# Patient Record
Sex: MALE | Race: WHITE | NOT HISPANIC OR LATINO | URBAN - METROPOLITAN AREA
[De-identification: names, ages, dates, MRNs, and addresses within clinical notes are randomized per-mention and may not be internally consistent; named-entity substitution may affect disease eponyms.]

---

## 2021-05-11 PROBLEM — Z00.00 ENCOUNTER FOR PREVENTIVE HEALTH EXAMINATION: Status: ACTIVE | Noted: 2021-05-11

## 2021-06-28 ENCOUNTER — OUTPATIENT (OUTPATIENT)
Dept: OUTPATIENT SERVICES | Facility: HOSPITAL | Age: 31
LOS: 1 days | Discharge: HOME | End: 2021-06-28

## 2021-06-28 DIAGNOSIS — E75.22 GAUCHER DISEASE: ICD-10-CM

## 2021-06-29 ENCOUNTER — RESULT REVIEW (OUTPATIENT)
Age: 31
End: 2021-06-29

## 2021-06-29 ENCOUNTER — APPOINTMENT (OUTPATIENT)
Dept: HEMATOLOGY ONCOLOGY | Facility: CLINIC | Age: 31
End: 2021-06-29
Payer: COMMERCIAL

## 2021-06-29 VITALS
TEMPERATURE: 98.4 F | WEIGHT: 150 LBS | OXYGEN SATURATION: 95 % | HEART RATE: 94 BPM | HEIGHT: 68 IN | BODY MASS INDEX: 22.73 KG/M2 | DIASTOLIC BLOOD PRESSURE: 82 MMHG | SYSTOLIC BLOOD PRESSURE: 126 MMHG | RESPIRATION RATE: 16 BRPM

## 2021-06-29 DIAGNOSIS — G43.909 MIGRAINE, UNSPECIFIED, NOT INTRACTABLE, W/OUT STATUS MIGRAINOSUS: ICD-10-CM

## 2021-06-29 DIAGNOSIS — Z78.9 OTHER SPECIFIED HEALTH STATUS: ICD-10-CM

## 2021-06-29 LAB
BASOPHILS # BLD AUTO: 0.03 K/UL — SIGNIFICANT CHANGE UP (ref 0–0.2)
BASOPHILS NFR BLD AUTO: 0.4 % — SIGNIFICANT CHANGE UP (ref 0–2)
EOSINOPHIL # BLD AUTO: 0.05 K/UL — SIGNIFICANT CHANGE UP (ref 0–0.5)
EOSINOPHIL NFR BLD AUTO: 0.6 % — SIGNIFICANT CHANGE UP (ref 0–6)
HCT VFR BLD CALC: 40.2 % — SIGNIFICANT CHANGE UP (ref 39–50)
HGB BLD-MCNC: 14.3 G/DL — SIGNIFICANT CHANGE UP (ref 13–17)
IMM GRANULOCYTES NFR BLD AUTO: 0.5 % — SIGNIFICANT CHANGE UP (ref 0–1.5)
LYMPHOCYTES # BLD AUTO: 1.79 K/UL — SIGNIFICANT CHANGE UP (ref 1–3.3)
LYMPHOCYTES # BLD AUTO: 23.1 % — SIGNIFICANT CHANGE UP (ref 13–44)
MCHC RBC-ENTMCNC: 32.3 PG — SIGNIFICANT CHANGE UP (ref 27–34)
MCHC RBC-ENTMCNC: 35.6 G/DL — SIGNIFICANT CHANGE UP (ref 32–36)
MCV RBC AUTO: 90.7 FL — SIGNIFICANT CHANGE UP (ref 80–100)
MONOCYTES # BLD AUTO: 0.48 K/UL — SIGNIFICANT CHANGE UP (ref 0–0.9)
MONOCYTES NFR BLD AUTO: 6.2 % — SIGNIFICANT CHANGE UP (ref 2–14)
NEUTROPHILS # BLD AUTO: 5.37 K/UL — SIGNIFICANT CHANGE UP (ref 1.8–7.4)
NEUTROPHILS NFR BLD AUTO: 69.2 % — SIGNIFICANT CHANGE UP (ref 43–77)
NRBC # BLD: 0 /100 WBCS — SIGNIFICANT CHANGE UP (ref 0–0)
PLATELET # BLD AUTO: 93 K/UL — LOW (ref 150–400)
RBC # BLD: 4.43 M/UL — SIGNIFICANT CHANGE UP (ref 4.2–5.8)
RBC # FLD: 12.7 % — SIGNIFICANT CHANGE UP (ref 10.3–14.5)
WBC # BLD: 7.76 K/UL — SIGNIFICANT CHANGE UP (ref 3.8–10.5)
WBC # FLD AUTO: 7.76 K/UL — SIGNIFICANT CHANGE UP (ref 3.8–10.5)

## 2021-06-29 PROCEDURE — 99205 OFFICE O/P NEW HI 60 MIN: CPT

## 2021-06-30 LAB
APTT BLD: 32.7 SEC
INR PPP: 1.16 RATIO
PT BLD: 13.6 SEC

## 2021-07-01 PROBLEM — G43.909 MIGRAINE HEADACHE: Status: ACTIVE | Noted: 2021-07-01

## 2021-07-01 PROBLEM — Z78.9 RARELY CONSUMES ALCOHOL: Status: ACTIVE | Noted: 2021-07-01

## 2021-07-01 NOTE — RESULTS/DATA
[FreeTextEntry1] : 6/29/21\par WBC 7760 Hgb 14.3 Hct 40.2 Platelets 93,000 Diff normal \par Smear: Decreased platelets with giant forms. NC/NC. Teardrops. WBC normal.

## 2021-07-01 NOTE — REVIEW OF SYSTEMS
[Negative] : Allergic/Immunologic [Fatigue] : fatigue [Patient Intake Form Reviewed] : Patient intake form was reviewed [de-identified] : migraine headaches

## 2021-07-01 NOTE — REASON FOR VISIT
[Initial Consultation] : an initial consultation for [Blood Count Assessment] : blood count assessment [FreeTextEntry2] : Gaucher's Disease

## 2021-07-01 NOTE — CONSULT LETTER
[Dear  ___] : Dear  [unfilled], [Consult Letter:] : I had the pleasure of evaluating your patient, [unfilled]. [Please see my note below.] : Please see my note below. [Consult Closing:] : Thank you very much for allowing me to participate in the care of this patient.  If you have any questions, please do not hesitate to contact me. [Sincerely,] : Sincerely, [DrKaterina  ___] : Dr. PAK [FreeTextEntry2] : Jonah Duque MD [FreeTextEntry3] : Roni Calabrese M.D., FACP\par Professor of Medicine\par [facility] [employer]\par Associate Chief, Division of Hematology\par [facility]\par [facility]\par [address] [place] [address]\[place] [address]\par [phone]

## 2021-07-01 NOTE — ADDENDUM
[FreeTextEntry1] : I, Elisabeth Agatha, acted solely as a scribe for Dr. Roni Calabrese on 06/29/2021. All medical entries made by the Scribe were at my, Dr. Roni Calabrese's, direction and personally dictated by me on 06/29/2021. I have reviewed the chart and agree that the record accurately reflects my personal performance of the history, physical exam, assessment and plan. I have also personally directed, reviewed, and agreed with the chart.

## 2021-07-01 NOTE — PHYSICAL EXAM
[Fully active, able to carry on all pre-disease performance without restriction] : Status 0 - Fully active, able to carry on all pre-disease performance without restriction [Normal] : pharynx is unremarkable, moist mucus membrane, no oral lesions [Normal Male] : prostate smooth, symmetric with no modularity or induration

## 2021-07-01 NOTE — ASSESSMENT
[Palliative Care Plan] : not applicable at this time [FreeTextEntry1] : 31 year old male with Gaucher cells found in the bone marrow of his mandible. He is asymptomatic and has no palpable hepatosplenomegaly. Complete blood count does reveal mild thrombocytopenia. I reviewed Gaucher's disease with him. We discussed that it is one of the [employer]azi J[employer]ism genetic diseases. I reviewed the hereditary pattern, its symptomatology, and potential therapies. I explained that further evaluation is indicated. \par \par Plan:\par Glucocerebrosidase level\par Gaucher mutation panel\par PT/APTT\par Avoid ASA/NSAIDS\par Call me in 2 weeks\par \par

## 2021-07-01 NOTE — HISTORY OF PRESENT ILLNESS
[Disease:__________________________] : Disease: [unfilled] [de-identified] : 31 year old male referred in consultation for possible Gaucher's disease. He had been seen in Dental consultation regarding gum disease. In April 2021, biopsy of his mandible revealed a histiocytic proliferation involving bone marrow consistent with Gaucher's disease. He feels well. He is always tired. He notes no headaches, visual problems, chest pain, SOB, abdominal pain, weight loss, bleeding, bruising, skeletal pain. There is no family history of Gaucher's disease.\par \par

## 2021-07-08 LAB
MISCELLANEOUS TEST: NORMAL
PROC NAME: NORMAL

## 2021-07-15 LAB — GAU1 GENE MUT TESTED BLD/T: POSITIVE

## 2022-01-04 ENCOUNTER — APPOINTMENT (OUTPATIENT)
Dept: MRI IMAGING | Facility: CLINIC | Age: 32
End: 2022-01-04

## 2022-01-12 ENCOUNTER — APPOINTMENT (OUTPATIENT)
Dept: MRI IMAGING | Facility: CLINIC | Age: 32
End: 2022-01-12
Payer: COMMERCIAL

## 2022-01-12 ENCOUNTER — TRANSCRIPTION ENCOUNTER (OUTPATIENT)
Age: 32
End: 2022-01-12

## 2022-01-12 ENCOUNTER — OUTPATIENT (OUTPATIENT)
Dept: OUTPATIENT SERVICES | Facility: HOSPITAL | Age: 32
LOS: 1 days | End: 2022-01-12

## 2022-01-12 ENCOUNTER — APPOINTMENT (OUTPATIENT)
Dept: RADIOLOGY | Facility: CLINIC | Age: 32
End: 2022-01-12
Payer: COMMERCIAL

## 2022-01-12 PROCEDURE — 72100 X-RAY EXAM L-S SPINE 2/3 VWS: CPT | Mod: 26

## 2022-01-12 PROCEDURE — 73718 MRI LOWER EXTREMITY W/O DYE: CPT | Mod: 26,RT

## 2022-01-12 PROCEDURE — 73552 X-RAY EXAM OF FEMUR 2/>: CPT | Mod: 26

## 2022-01-12 PROCEDURE — 73718 MRI LOWER EXTREMITY W/O DYE: CPT | Mod: 26,LT

## 2022-01-12 PROCEDURE — 72040 X-RAY EXAM NECK SPINE 2-3 VW: CPT | Mod: 26

## 2022-01-12 PROCEDURE — 74181 MRI ABDOMEN W/O CONTRAST: CPT | Mod: 26

## 2022-01-13 PROCEDURE — 77080 DXA BONE DENSITY AXIAL: CPT | Mod: 26

## 2022-02-11 ENCOUNTER — OUTPATIENT (OUTPATIENT)
Dept: OUTPATIENT SERVICES | Facility: HOSPITAL | Age: 32
LOS: 1 days | Discharge: HOME | End: 2022-02-11

## 2022-02-11 DIAGNOSIS — E75.22 GAUCHER DISEASE: ICD-10-CM

## 2022-02-15 ENCOUNTER — LABORATORY RESULT (OUTPATIENT)
Age: 32
End: 2022-02-15

## 2022-02-15 ENCOUNTER — APPOINTMENT (OUTPATIENT)
Dept: HEMATOLOGY ONCOLOGY | Facility: CLINIC | Age: 32
End: 2022-02-15
Payer: COMMERCIAL

## 2022-02-15 ENCOUNTER — RESULT REVIEW (OUTPATIENT)
Age: 32
End: 2022-02-15

## 2022-02-15 VITALS
BODY MASS INDEX: 23.17 KG/M2 | DIASTOLIC BLOOD PRESSURE: 74 MMHG | TEMPERATURE: 97.1 F | OXYGEN SATURATION: 97 % | WEIGHT: 152.88 LBS | RESPIRATION RATE: 14 BRPM | HEIGHT: 68.11 IN | SYSTOLIC BLOOD PRESSURE: 110 MMHG | HEART RATE: 62 BPM

## 2022-02-15 DIAGNOSIS — D69.6 THROMBOCYTOPENIA, UNSPECIFIED: ICD-10-CM

## 2022-02-15 DIAGNOSIS — E75.22 GAUCHER DISEASE: ICD-10-CM

## 2022-02-15 LAB
BASOPHILS # BLD AUTO: 0.04 K/UL — SIGNIFICANT CHANGE UP (ref 0–0.2)
BASOPHILS NFR BLD AUTO: 0.5 % — SIGNIFICANT CHANGE UP (ref 0–2)
EOSINOPHIL # BLD AUTO: 0.1 K/UL — SIGNIFICANT CHANGE UP (ref 0–0.5)
EOSINOPHIL NFR BLD AUTO: 1.3 % — SIGNIFICANT CHANGE UP (ref 0–6)
HCT VFR BLD CALC: 38.7 % — LOW (ref 39–50)
HGB BLD-MCNC: 14 G/DL — SIGNIFICANT CHANGE UP (ref 13–17)
IMM GRANULOCYTES NFR BLD AUTO: 1.3 % — SIGNIFICANT CHANGE UP (ref 0–1.5)
LYMPHOCYTES # BLD AUTO: 2.02 K/UL — SIGNIFICANT CHANGE UP (ref 1–3.3)
LYMPHOCYTES # BLD AUTO: 26.2 % — SIGNIFICANT CHANGE UP (ref 13–44)
MCHC RBC-ENTMCNC: 32.6 PG — SIGNIFICANT CHANGE UP (ref 27–34)
MCHC RBC-ENTMCNC: 36.2 G/DL — HIGH (ref 32–36)
MCV RBC AUTO: 90 FL — SIGNIFICANT CHANGE UP (ref 80–100)
MONOCYTES # BLD AUTO: 0.53 K/UL — SIGNIFICANT CHANGE UP (ref 0–0.9)
MONOCYTES NFR BLD AUTO: 6.9 % — SIGNIFICANT CHANGE UP (ref 2–14)
NEUTROPHILS # BLD AUTO: 4.92 K/UL — SIGNIFICANT CHANGE UP (ref 1.8–7.4)
NEUTROPHILS NFR BLD AUTO: 63.8 % — SIGNIFICANT CHANGE UP (ref 43–77)
NRBC # BLD: 0 /100 WBCS — SIGNIFICANT CHANGE UP (ref 0–0)
PLATELET # BLD AUTO: 82 K/UL — LOW (ref 150–400)
RBC # BLD: 4.3 M/UL — SIGNIFICANT CHANGE UP (ref 4.2–5.8)
RBC # FLD: 12.9 % — SIGNIFICANT CHANGE UP (ref 10.3–14.5)
WBC # BLD: 7.71 K/UL — SIGNIFICANT CHANGE UP (ref 3.8–10.5)
WBC # FLD AUTO: 7.71 K/UL — SIGNIFICANT CHANGE UP (ref 3.8–10.5)

## 2022-02-15 PROCEDURE — 99215 OFFICE O/P EST HI 40 MIN: CPT

## 2022-02-15 NOTE — CONSULT LETTER
[Dear  ___] : Dear  [unfilled], [Courtesy Letter:] : I had the pleasure of seeing your patient, [unfilled], in my office today. [Please see my note below.] : Please see my note below. [Sincerely,] : Sincerely, [DrKaterina  ___] : Dr. PAK [FreeTextEntry2] : Jonah Duque MD [FreeTextEntry3] : Roni Calabrese M.D., FACP\par Professor of Medicine\par [facility] [employer]\par Associate Chief, Division of Hematology\par [facility]\par [facility]\par [address] [place] [address]\[place] [address]\par [phone]

## 2022-02-15 NOTE — ASSESSMENT
[Palliative Care Plan] : not applicable at this time [FreeTextEntry1] : 32 year old male with Gaucher cells found in the bone marrow of his mandible. 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 He is asymptomatic and has no palpable hepatosplenomegaly on physical examination today. 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 However, he does not report any skeletal pain. Platelets are now dropping. I reviewed the hereditary pattern, its symptomatology, and potential therapies. Explained that all their children are at least carriers. Given his worsening thrombocytopenia and skeletal changes, we discussed treatment with oral therapies or enzyme replacement therapies such as Cerezyme in detail with the patient and his wife. Discussed risks, benefits, and side effects. They wish to take some time to consider if they will pursue therapy. They live in [place] and will consider pursuing treatment in [place]. Answered all questions.\par \par Plan:\par Avoid ASA/NSAIDS\par Consider Cerezyme \par Bone densitometry\par CMP, LDH, TFTs, SPEP, ACE, chitotriosidase level, Fe/TIBC, ferritin, total acid phosphatase \par COVID booster vaccine \par Flu vaccine \par Screen first degree relatives and spouse for Gaucher's disease \par RTC 2 months\par \par \par \par \par \par \par

## 2022-02-15 NOTE — ADDENDUM
[FreeTextEntry1] : I, Elisabeth Agatha, acted solely as a scribe for Dr. Roni Calabrese on 02/15/2022. All medical entries made by the Scribe were at my, Dr. Roni Calabrese's, direction and personally dictated by me on 02/15/2022. I have reviewed the chart and agree that the record accurately reflects my personal performance of the history, physical exam, assessment and plan. I have also personally directed, reviewed, and agreed with the chart.

## 2022-02-15 NOTE — RESULTS/DATA
[FreeTextEntry1] : WBC 7710 Hgb 14 Hct 38.7 Platelets 82,000 Diff normal \par \par 1/12/22\par MRI abdomen: Massive splenomegaly. Hepatomegaly. Diffusely hypointense bone marrow signal with H-shaped vertebra at L2 and L3, consistent with Gaucher disease. 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 Mild levocurvature of the thoracolumbar junction.\par \par 6/29/21\par PT 13.6, INR 1.16\par APTT 32.7\par Glucocerebrosidase level 1.11 nmol/h/mg Prot\par Gauchers DNA positive for two copies of N370S\par \par

## 2022-02-15 NOTE — REASON FOR VISIT
[Follow-Up Visit] : a follow-up visit for [Blood Count Assessment] : blood count assessment [Spouse] : spouse [FreeTextEntry2] : Gaucher's Disease

## 2022-02-15 NOTE — HISTORY OF PRESENT ILLNESS
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 He is fatigued at times. No other complaints. He notes no headaches, visual problems, chest pain, SOB, abdominal pain, weight loss, bleeding, bruising, skeletal pain. Had J&J COVID vaccine.

## 2022-02-17 LAB
ACE BLD-CCNC: 179 U/L
ALBUMIN SERPL ELPH-MCNC: 5.2 G/DL
ALP BLD-CCNC: 62 U/L
ALT SERPL-CCNC: 39 U/L
ANION GAP SERPL CALC-SCNC: 12 MMOL/L
AST SERPL-CCNC: 27 U/L
BILIRUB SERPL-MCNC: 1.1 MG/DL
BUN SERPL-MCNC: 17 MG/DL
CALCIUM SERPL-MCNC: 9.7 MG/DL
CHLORIDE SERPL-SCNC: 100 MMOL/L
CO2 SERPL-SCNC: 26 MMOL/L
CREAT SERPL-MCNC: 0.97 MG/DL
FERRITIN SERPL-MCNC: 1237 NG/ML
GLUCOSE SERPL-MCNC: 96 MG/DL
IRON SATN MFR SERPL: 24 %
IRON SERPL-MCNC: 67 UG/DL
LDH SERPL-CCNC: 132 U/L
POTASSIUM SERPL-SCNC: 4.4 MMOL/L
PROT SERPL-MCNC: 7.9 G/DL
SODIUM SERPL-SCNC: 138 MMOL/L
T3RU NFR SERPL: 0.9 TBI
T4 SERPL-MCNC: 8.4 UG/DL
TIBC SERPL-MCNC: 280 UG/DL
TSH SERPL-ACNC: 2.58 UIU/ML
UIBC SERPL-MCNC: 213 UG/DL

## 2022-02-18 LAB
ALBUMIN MFR SERPL ELPH: 59.1 %
ALBUMIN SERPL-MCNC: 4.7 G/DL
ALBUMIN/GLOB SERPL: 1.5 RATIO
ALPHA1 GLOB MFR SERPL ELPH: 4 %
ALPHA1 GLOB SERPL ELPH-MCNC: 0.3 G/DL
ALPHA2 GLOB MFR SERPL ELPH: 7.1 %
ALPHA2 GLOB SERPL ELPH-MCNC: 0.6 G/DL
B-GLOBULIN MFR SERPL ELPH: 9.5 %
B-GLOBULIN SERPL ELPH-MCNC: 0.8 G/DL
GAMMA GLOB FLD ELPH-MCNC: 1.6 G/DL
GAMMA GLOB MFR SERPL ELPH: 20.3 %
INTERPRETATION SERPL IEP-IMP: NORMAL
PROT SERPL-MCNC: 7.9 G/DL
PROT SERPL-MCNC: 7.9 G/DL

## 2022-02-23 LAB
MISCELLANEOUS TEST: NORMAL
PROC NAME: NORMAL

## 2022-03-02 LAB — CHITOTRIOSIDASE SERPL-CCNC: 3620 NMOLES/HR/ML
